# Patient Record
Sex: MALE | Race: WHITE
[De-identification: names, ages, dates, MRNs, and addresses within clinical notes are randomized per-mention and may not be internally consistent; named-entity substitution may affect disease eponyms.]

---

## 2018-05-16 ENCOUNTER — HOSPITAL ENCOUNTER (EMERGENCY)
Dept: HOSPITAL 68 - ERH | Age: 28
End: 2018-05-16
Payer: COMMERCIAL

## 2018-05-16 VITALS — DIASTOLIC BLOOD PRESSURE: 95 MMHG | SYSTOLIC BLOOD PRESSURE: 140 MMHG

## 2018-05-16 VITALS — HEIGHT: 68 IN | WEIGHT: 140 LBS | BODY MASS INDEX: 21.22 KG/M2

## 2018-05-16 DIAGNOSIS — E86.0: ICD-10-CM

## 2018-05-16 DIAGNOSIS — G43.A0: Primary | ICD-10-CM

## 2018-05-16 LAB
ABSOLUTE GRANULOCYTE CT: 9.6 /CUMM (ref 1.4–6.5)
BASOPHILS # BLD: 0 /CUMM (ref 0–0.2)
BASOPHILS NFR BLD: 0 % (ref 0–2)
EOSINOPHIL # BLD: 0 /CUMM (ref 0–0.7)
EOSINOPHIL NFR BLD: 0 % (ref 0–5)
ERYTHROCYTE [DISTWIDTH] IN BLOOD BY AUTOMATED COUNT: 13.2 % (ref 11.5–14.5)
GRANULOCYTES NFR BLD: 93.3 % (ref 42.2–75.2)
HCT VFR BLD CALC: 51.1 % (ref 42–52)
LYMPHOCYTES # BLD: 0.5 /CUMM (ref 1.2–3.4)
MCH RBC QN AUTO: 32.9 PG (ref 27–31)
MCHC RBC AUTO-ENTMCNC: 33.8 G/DL (ref 33–37)
MCV RBC AUTO: 97.4 FL (ref 80–94)
MONOCYTES # BLD: 0.2 /CUMM (ref 0.1–0.6)
PLATELET # BLD: 144 /CUMM (ref 130–400)
PMV BLD AUTO: 8.2 FL (ref 7.4–10.4)
RED BLOOD CELL CT: 5.24 /CUMM (ref 4.7–6.1)
WBC # BLD AUTO: 10.3 /CUMM (ref 4.8–10.8)

## 2018-05-16 NOTE — ED GI/GU/ABDOMINAL COMPLAINT
History of Present Illness
 
General
Chief Complaint: Abdominal Pain/Flank Pain
Stated Complaint: ABD PAIN AND VOMITTING
Source: patient, old records, friend
Exam Limitations: no limitations
 
Vital Signs & Intake/Output
Vital Signs & Intake/Output
 Vital Signs
 
 
Date Time Temp Pulse Resp B/P B/P Pulse O2 O2 Flow FiO2
 
     Mean Ox Delivery Rate 
 
05/16 2145 98.1 118 18 140/95  99   
 
05/16 2001 98.3 128 18 147/95  98 Room Air  
 
 
 
Allergies
Coded Allergies:
Penicillins (Mild, RASH 05/16/18)
 
Reconcile Medications
Ondansetron (Zofran Odt) 4 MG TAB.RAPDIS   1 TAB PO Q6 PRN NAUSEA
 
Triage Note:
PT FROM HOME C/O DEHYDRATION FOR THE PAST 2X DAYS.
 PT IS UNABLE FOR THE PAST 2 DAYS TO USE THE
 RESTROOM, OR TOLERATE ANYTHING BY MOUTH. PTS BP
 ELEVATED 147/95, . PT STATES "FLARE
 UP/EPISODE" OF HX HIS HC OF CYCLIC VOMITING
 SNYDROME. AFEBRILE. PT STATES MEDICATED QITH 1MG
 CLONODIN AROUND 0800.
Triage Nurses Notes Reviewed? yes
Onset: 2 days
Duration: day(s):, constant, continues in ED
Timing: recent history
Quality/Severity: aching, moderate, vomiting
Location: generalized abdomen
Radiation: no radiation
Activities at Onset: none
Prior Abdominal Problems: similar symptoms
Past Sexual History: Unobtainable at this time
Modifying Factors:
Worsens With: eating. 
Associated Symptoms: abdominal pain, loss of appetite, nausea/vomiting
HPI:
Patient has a history of cyclic vomiting syndrome.
2 days prior to admission after some anxiety provoking stress he developed 
frequent nausea vomiting abdominal discomfort unable to tolerate solid food or 
liquid.  Complains of being dizzy and fatigued.
He denies fever chills chest pain cough shortness of breath headache dysuria 
rash bleeding diarrhea.
 
Past History
 
Travel History
Traveled to [place] past 21 day No
 
Medical History
Any Pertinent Medical History? see below for history
Neurological: NONE
EENT: NONE
Cardiovascular: NONE
Respiratory: NONE
Gastrointestinal: CYCLIC VOMITING SYNDROME
Hepatic: NONE
Renal: NONE
Musculoskeletal: FEMUR FX ANKLE SPRAIN
Psychiatric: NONE
Endocrine: NONE
Blood Disorders: NONE
Cancer(s): NONE
GYN/Reproductive: NONE
 
Surgical History
Surgical History: PYLONODAL CYST REMOVED
 
Psychosocial History
What is your primary language English
Tobacco Use: Quit >30 days ago
ETOH Use: occasional use
Illicit Drug Use: denies illicit drug use
 
Family History
Hx Contributory? No
 
Review of Systems
 
Review of Systems
Constitutional:
Reports: see HPI, malaise. 
EENTM:
Reports: no symptoms. 
Respiratory:
Reports: no symptoms. 
Cardiovascular:
Reports: no symptoms. 
GI:
Reports: see HPI, abdominal pain, nausea, vomiting. 
Genitourinary:
Reports: no symptoms. 
Musculoskeletal:
Reports: no symptoms. 
Skin:
Reports: no symptoms. 
Neurological/Psychological:
Reports: no symptoms. 
Hematologic/Endocrine:
Reports: no symptoms. 
Immunologic/Allergic:
Reports: no symptoms. 
All Other Systems: Reviewed and Negative
 
Physical Exam
 
Physical Exam
General Appearance: well developed/nourished, alert, awake, anxious, mild 
distress, thin
Head: atraumatic, normal appearance
Eyes:
Bilateral: normal appearance, PERRL, EOMI, normal inspection. 
Ears, Nose, Throat, Mouth: hearing grossly normal, Dry mucous membranes
Neck: normal inspection, supple, full range of motion, normal alignment
Respiratory: normal breath sounds, chest non-tender, no respiratory distress, 
quiet respiration, lungs clear
Cardiovascular: regular rate/rhythm, normal peripheral pulses, norml femoral 
pulses equa
Peripheral Pulses:
4+ carotid (R), 4+ carotid (L)
Gastrointestinal: soft, non-tender, no organomegaly, abnormal bowel sounds
Male Genitals: normal genitalia
Back: normal inspection, normal range of motion
Extremities: normal range of motion, no ligament instability
Neurologic/Psych: no motor/sensory deficits, awake, alert, oriented x 3, normal 
gait, normal mood/affect, CNs II-XII nml as tested
Skin: intact, normal color, warm/dry
 
Core Measures
ACS in differential dx? No
Sepsis Present: No
Sepsis Focused Exam Completed? No
 
Progress
Differential Diagnosis: biliary colic, gastritis, pancreatitis
Plan of Care:
 Orders
 
 
Procedure Date/time Status
 
URINALYSIS 05/16 1925 Complete
 
LIPASE 05/16 1925 Complete
 
COMPREHENSIVE METABOLIC PANEL 05/16 1925 Complete
 
CBC WITHOUT DIFFERENTIAL 05/16 1925 Complete
 
 
 Laboratory Tests
 
 
 
05/16/18 2242:
Urine Color YEL, Urine Clarity CLEAR, Urine pH 6.0, Ur Specific Gravity >= 1.030
, Urine Protein 30  H, Urine Ketones 15  H, Urine Nitrite NEG, Urine Bilirubin 
NEG, Urine Urobilinogen 0.2, Ur Leukocyte Esterase NEG, Ur Microscopic SEDIMENT 
EXAMINED, Urine RBC FEW  H, Urine WBC RARE, Ur Epithelial Cells RARE, Urine 
Bacteria FEW  H, Urine Mucus MOD  H, Urine Hemoglobin NEG, Urine Glucose NEG
 
05/16/18 1957:
Anion Gap 28  H, Estimated GFR > 60, BUN/Creatinine Ratio 10.0, Glucose 87, 
Calcium 10.5  H, Total Bilirubin 0.8, AST 86  H,   H, Alkaline 
Phosphatase 77, Total Protein 8.8  H, Albumin 5.8  H, Globulin 3.0, Albumin/
Globulin Ratio 1.9, Lipase 120, CBC w Diff NO MAN DIFF REQ, RBC 5.24, MCV 97.4  
H, MCH 32.9  H, MCHC 33.8, RDW 13.2, MPV 8.2, Gran % 93.3  H, Lymphocytes % 5.0 
L, Monocytes % 1.7, Eosinophils % 0, Basophils % 0, Absolute Granulocytes 9.6  H
, Absolute Lymphocytes 0.5  L, Absolute Monocytes 0.2, Absolute Eosinophils 0, 
Absolute Basophils 0
 
Initial ED EKG: none
 
Departure
 
Departure
Time of Disposition: 2316
Disposition: HOME OR SELF CARE
Condition: Stable
Clinical Impression
Primary Impression: Cyclic vomiting syndrome
Secondary Impressions: Dehydration
Referrals:
Patient Has No Primary Care Dr (PCP/Family)
 
Departure Forms:
Customer Survey
General Discharge Information
Prescriptions:
Current Visit Scripts
Metoclopramide HCl (Reglan) 1 TAB PO 4 TIMES/DAY PRN nausea/vomiting 
     #60 TAB 
     30 minutes before meals and bedtime